# Patient Record
Sex: MALE | Race: WHITE | URBAN - METROPOLITAN AREA
[De-identification: names, ages, dates, MRNs, and addresses within clinical notes are randomized per-mention and may not be internally consistent; named-entity substitution may affect disease eponyms.]

---

## 2023-01-27 ENCOUNTER — OFFICE VISIT (OUTPATIENT)
Dept: ORTHOPEDIC SURGERY | Age: 53
End: 2023-01-27

## 2023-01-27 VITALS — DIASTOLIC BLOOD PRESSURE: 89 MMHG | SYSTOLIC BLOOD PRESSURE: 130 MMHG | OXYGEN SATURATION: 98 % | HEART RATE: 79 BPM

## 2023-01-27 DIAGNOSIS — M17.12 PRIMARY OSTEOARTHRITIS OF LEFT KNEE: Primary | ICD-10-CM

## 2023-01-27 DIAGNOSIS — M23.42 LOOSE BODY IN KNEE, LEFT KNEE: ICD-10-CM

## 2023-01-27 RX ORDER — ASPIRIN 325 MG
325 TABLET ORAL DAILY
COMMUNITY
Start: 2016-07-14

## 2023-01-27 RX ORDER — BUPROPION HYDROCHLORIDE 150 MG/1
TABLET, EXTENDED RELEASE ORAL
COMMUNITY
Start: 2022-12-27

## 2023-01-27 RX ORDER — AMLODIPINE BESYLATE 10 MG/1
10 TABLET ORAL DAILY
COMMUNITY
Start: 2017-12-05

## 2023-01-27 RX ORDER — ESCITALOPRAM OXALATE 20 MG/1
1 TABLET ORAL DAILY
COMMUNITY
Start: 2023-01-24

## 2023-01-27 RX ORDER — HYDROCHLOROTHIAZIDE 12.5 MG/1
1 TABLET ORAL DAILY
COMMUNITY
Start: 2022-08-30

## 2023-01-27 RX ORDER — TESTOSTERONE CYPIONATE 200 MG/ML
0.4 INJECTION INTRAMUSCULAR WEEKLY
COMMUNITY
Start: 2018-08-10

## 2023-01-27 RX ORDER — LOSARTAN POTASSIUM 100 MG/1
1 TABLET ORAL DAILY
COMMUNITY
Start: 2022-08-05

## 2023-01-27 RX ORDER — CLOPIDOGREL BISULFATE 75 MG/1
75 TABLET ORAL DAILY
COMMUNITY
Start: 2023-01-24

## 2023-01-27 RX ORDER — GLIPIZIDE 5 MG/1
1 TABLET ORAL DAILY
COMMUNITY
Start: 2023-01-24

## 2023-01-27 NOTE — PROGRESS NOTES
New patient, 45year old male, is here for consult on chronic LEFT knee pain. Patient states his pain is chronic. Most pronounced when climbing stairs. Pain sharp and stabbing in the outside of his knee. Patient denies any numbness or tingling. No history of injury or procedure in left knee. Open to injection. Pain rated 3/10 today.

## 2023-01-27 NOTE — PROGRESS NOTES
1/27/2023   Chief Complaint   Patient presents with    Knee Pain     Chronic LEFT knee pain         History of Present Illness:                             Kary Lopez is a 45 y.o. male who presents today for evaluation of his chronic left knee pain. This has been going on for a couple of years but getting worse over the last 6 months. He has occasional feelings of popping or clicking at the anterior lateral aspect of the knee and has noticed a prominent area that is tender and somewhat mobile. He denies any significant traumatic events but does do repetitive squatting and climbing activities and carries heavy equipment at work. Pain is most significant on the anterior lateral aspect of the knee and worse with going up and down stairs or squatting. New patient, 45year old male, is here for consult on chronic LEFT knee pain. Patient states his pain is chronic. Most pronounced when climbing stairs. Pain sharp and stabbing in the outside of his knee. Patient denies any numbness or tingling. No history of injury or procedure in left knee. Open to injection. Pain rated 3/10 today. Medical History  Patient's medications, allergies, past medical, surgical, social and family histories were reviewed and updated as appropriate. No past medical history on file. No past surgical history on file. No family history on file. Social History     Socioeconomic History    Marital status: Unknown     Current Outpatient Medications   Medication Sig Dispense Refill    amLODIPine (NORVASC) 10 MG tablet Take 10 mg by mouth daily      aspirin 325 MG tablet Take 325 mg by mouth daily      hydroCHLOROthiazide (HYDRODIURIL) 12.5 MG tablet Take 1 tablet by mouth daily      losartan (COZAAR) 100 MG tablet Take 1 tablet by mouth daily      metFORMIN (GLUCOPHAGE) 1000 MG tablet Take 1 tablet by mouth in the morning and 1 tablet in the evening.       testosterone cypionate (DEPOTESTOTERONE CYPIONATE) 200 MG/ML injection Inject 0.4 mLs into the muscle once a week. buPROPion (WELLBUTRIN SR) 150 MG extended release tablet       clopidogrel (PLAVIX) 75 MG tablet Take 75 mg by mouth daily      escitalopram (LEXAPRO) 20 MG tablet Take 1 tablet by mouth daily      glipiZIDE (GLUCOTROL) 5 MG tablet Take 1 tablet by mouth daily       No current facility-administered medications for this visit. Allergies   Allergen Reactions    Sulfa Antibiotics Other (See Comments)     Other reaction(s): Other - comment required  Causes hot flushes  Other reaction(s): Other - comment required  Causes hot flushes  Causes hot flushes  Causes hot flushes           Review of Systems   Constitutional:  Negative for chills and fever. HENT:  Negative for congestion and sneezing. Eyes:  Negative for pain and redness. Respiratory:  Negative for chest tightness, shortness of breath and wheezing. Cardiovascular:  Negative for chest pain and palpitations. Gastrointestinal:  Negative for vomiting. Musculoskeletal:  Positive for arthralgias. Skin:  Negative for color change and rash. Neurological:  Negative for weakness and numbness. Psychiatric/Behavioral:  Negative for agitation. The patient is not nervous/anxious. Examination:  General Exam:  Vitals: /89 (Site: Right Upper Arm, Position: Sitting)   Pulse 79   SpO2 98%    Physical Exam  Vitals and nursing note reviewed. Constitutional:       Appearance: Normal appearance. HENT:      Head: Normocephalic and atraumatic. Eyes:      Conjunctiva/sclera: Conjunctivae normal.      Pupils: Pupils are equal, round, and reactive to light. Pulmonary:      Effort: Pulmonary effort is normal.   Musculoskeletal:      Cervical back: Normal range of motion. Right hip: Normal.      Left hip: No deformity, tenderness, bony tenderness or crepitus. Normal range of motion. Normal strength.       Right knee: No swelling, deformity, effusion, erythema, ecchymosis or bony tenderness. Normal range of motion. No medial joint line or lateral joint line tenderness. No LCL laxity or MCL laxity. Normal alignment and normal patellar mobility. Comments: Left Lower Extremity:  There is moderate tenderness to palpation throughout the knee most significant along the anterior lateral joint line and lateral patellofemoral.  There is a palpable prominence which is firm and somewhat mobile at the lateral patellofemoral joint. There is a mild effusion present and mild global swelling at the knee anteriorly. Mild restricted range of motion at the knee with approximately 3 degrees short of full extension and knee flexion up to 130 degrees with pain at the extremes of motion. There is mild crepitation at the knee during active range of motion. There is normal knee alignment. There is 5 out of 5 strength with knee flexion and extension. There is no instability to varus or valgus stress testing or anterior and posterior drawer testing. Sensation is intact to light touch throughout the lower extremity. There is a moderately positive Ralph's test with tenderness to palpation and pain along the medial joint line. Skin is intact. Pulses intact    No pain with active range of motion of the hip. Strength and range of motion of the hip are intact. No tenderness to palpation at the hip. Skin:     General: Skin is warm and dry. Neurological:      Mental Status: He is alert and oriented to person, place, and time. Psychiatric:         Mood and Affect: Mood normal.         Behavior: Behavior normal.          Diagnostic testing:  X-ray images were reviewed by myself and discussed with the patient:  XR KNEE LEFT (MIN 4 VIEWS)    Result Date: 1/27/2023  3 views of the left knee show evidence of mild tricompartmental degenerative joint disease with no significant joint space narrowing.   Mild irregularities and spurring present at the lateral patellofemoral compartments. There is a chronic calcified loose body adjacent to the lateral femoral condyle seen on the sunrise view. No evidence of fracture or acute abnormality. Normal bone density. Normal alignment. Impression: Mild tricompartmental degenerative changes with lateral gutter calcified loose body       Office Procedures:  No orders of the defined types were placed in this encounter. Assessment and Plan  1. left knee mild primary osteoarthritis    2. Left knee intra-articular loose body    His x-rays and physical exam are consistent with a symptomatic loose body at the lateral patellofemoral compartment. Due to the chronicity and severity of his pain and mechanical issues, I feel is medically necessary to obtain an MRI to better characterize the loose body evaluate for other intra-articular pathology such as a meniscus tear or cartilage lesion. We discussed conservative treatments including weight loss, stretching exercises, and over-the-counter anti-inflammatory medications which she will continue.     I have ordered an MRI and he will follow-up once it is complete    Electronically signed by Emery Shelby MD on 1/27/2023 at 10:01 AM

## 2023-01-27 NOTE — PATIENT INSTRUCTIONS
Continue weight-bearing as tolerated. Continue range of motion exercises as instructed. Ice and elevate as needed. Tylenol or Motrin for pain. Central scheduling [phone] will be calling you to schedule your MRI. If you do not hear from them with in a week give them a call.

## 2023-01-28 ASSESSMENT — ENCOUNTER SYMPTOMS
EYE PAIN: 0
SHORTNESS OF BREATH: 0
CHEST TIGHTNESS: 0
VOMITING: 0
COLOR CHANGE: 0
EYE REDNESS: 0
WHEEZING: 0